# Patient Record
Sex: MALE | Race: BLACK OR AFRICAN AMERICAN | Employment: FULL TIME | URBAN - METROPOLITAN AREA
[De-identification: names, ages, dates, MRNs, and addresses within clinical notes are randomized per-mention and may not be internally consistent; named-entity substitution may affect disease eponyms.]

---

## 2017-12-10 ENCOUNTER — HOSPITAL ENCOUNTER (EMERGENCY)
Age: 52
Discharge: HOME | End: 2017-12-10
Attending: EMERGENCY MEDICINE
Payer: COMMERCIAL

## 2017-12-10 ENCOUNTER — APPOINTMENT (OUTPATIENT)
Dept: GENERAL RADIOLOGY | Age: 52
End: 2017-12-10
Attending: EMERGENCY MEDICINE
Payer: COMMERCIAL

## 2017-12-10 VITALS
SYSTOLIC BLOOD PRESSURE: 110 MMHG | DIASTOLIC BLOOD PRESSURE: 65 MMHG | RESPIRATION RATE: 16 BRPM | OXYGEN SATURATION: 98 % | HEART RATE: 75 BPM | TEMPERATURE: 99.1 F

## 2017-12-10 DIAGNOSIS — J18.9 PNEUMONIA DUE TO INFECTIOUS ORGANISM, UNSPECIFIED LATERALITY, UNSPECIFIED PART OF LUNG: Primary | ICD-10-CM

## 2017-12-10 DIAGNOSIS — R55 SYNCOPE AND COLLAPSE: ICD-10-CM

## 2017-12-10 LAB
ANION GAP SERPL CALC-SCNC: 8 MMOL/L (ref 3–18)
BASOPHILS # BLD: 0 K/UL (ref 0–0.1)
BASOPHILS NFR BLD: 0 % (ref 0–2)
BUN SERPL-MCNC: 15 MG/DL (ref 7–18)
BUN/CREAT SERPL: 12 (ref 12–20)
CALCIUM SERPL-MCNC: 8.7 MG/DL (ref 8.5–10.1)
CHLORIDE SERPL-SCNC: 102 MMOL/L (ref 100–108)
CO2 SERPL-SCNC: 28 MMOL/L (ref 21–32)
CREAT SERPL-MCNC: 1.21 MG/DL (ref 0.6–1.3)
DIFFERENTIAL METHOD BLD: ABNORMAL
EOSINOPHIL # BLD: 0 K/UL (ref 0–0.4)
EOSINOPHIL NFR BLD: 0 % (ref 0–5)
ERYTHROCYTE [DISTWIDTH] IN BLOOD BY AUTOMATED COUNT: 17.6 % (ref 11.6–14.5)
GLUCOSE SERPL-MCNC: 153 MG/DL (ref 74–99)
HCT VFR BLD AUTO: 39.4 % (ref 36–48)
HGB BLD-MCNC: 12.4 G/DL (ref 13–16)
LYMPHOCYTES # BLD: 0.9 K/UL (ref 0.9–3.6)
LYMPHOCYTES NFR BLD: 6 % (ref 21–52)
MCH RBC QN AUTO: 21.5 PG (ref 24–34)
MCHC RBC AUTO-ENTMCNC: 31.5 G/DL (ref 31–37)
MCV RBC AUTO: 68.3 FL (ref 74–97)
MONOCYTES # BLD: 0.7 K/UL (ref 0.05–1.2)
MONOCYTES NFR BLD: 5 % (ref 3–10)
NEUTS SEG # BLD: 13.8 K/UL (ref 1.8–8)
NEUTS SEG NFR BLD: 89 % (ref 40–73)
PLATELET # BLD AUTO: 362 K/UL (ref 135–420)
PMV BLD AUTO: 9.5 FL (ref 9.2–11.8)
POTASSIUM SERPL-SCNC: 4.4 MMOL/L (ref 3.5–5.5)
RBC # BLD AUTO: 5.77 M/UL (ref 4.7–5.5)
SODIUM SERPL-SCNC: 138 MMOL/L (ref 136–145)
WBC # BLD AUTO: 15.4 K/UL (ref 4.6–13.2)

## 2017-12-10 PROCEDURE — 80048 BASIC METABOLIC PNL TOTAL CA: CPT | Performed by: EMERGENCY MEDICINE

## 2017-12-10 PROCEDURE — 99284 EMERGENCY DEPT VISIT MOD MDM: CPT

## 2017-12-10 PROCEDURE — 74011250637 HC RX REV CODE- 250/637: Performed by: EMERGENCY MEDICINE

## 2017-12-10 PROCEDURE — 71010 XR CHEST SNGL V: CPT

## 2017-12-10 PROCEDURE — 85025 COMPLETE CBC W/AUTO DIFF WBC: CPT | Performed by: EMERGENCY MEDICINE

## 2017-12-10 PROCEDURE — 93005 ELECTROCARDIOGRAM TRACING: CPT

## 2017-12-10 RX ORDER — LEVOFLOXACIN 750 MG/1
750 TABLET ORAL
Status: COMPLETED | OUTPATIENT
Start: 2017-12-10 | End: 2017-12-10

## 2017-12-10 RX ORDER — LEVOFLOXACIN 750 MG/1
750 TABLET ORAL DAILY
Qty: 5 TAB | Refills: 0 | Status: SHIPPED | OUTPATIENT
Start: 2017-12-10 | End: 2017-12-15

## 2017-12-10 RX ADMIN — LEVOFLOXACIN 750 MG: 750 TABLET, FILM COATED ORAL at 15:05

## 2017-12-10 NOTE — ED PROVIDER NOTES
EMERGENCY DEPARTMENT HISTORY AND PHYSICAL EXAM    2:13 PM      Date: 12/10/2017  Patient Name: Yessy Esquivel    History of Presenting Illness     Chief Complaint   Patient presents with    Syncope         History Provided By: Patient    Chief Complaint: Lightheadedness, Syncope  Duration:  Seconds  Timing:  Acute  Location: N/A  Quality: N/A  Severity: N/A  Modifying Factors: Made worse with position changes, sitting to standing. Associated Symptoms: diaphoresis, cough, congestion, chills      Additional History (Context): Yessy Esquivel is a 47 y.o. male with a history of HTN, HLD, and diabetes, who presents to the ED via EMS with complaint of lightheadedness with witnessed syncope and fall just prior to arrival. Patient states that he was feeling lightheaded when sitting down, so he decided to go take a nap. Patient reports increased lightheadedness with standing \"and the next thing I knew I was on the ground. \" Patient does not remember falling. His fiance witnessed the episode and states that patient braced against the wall and fell. She denies head trauma as she was next to patient and notes that his head hit her foot. Patient denies current lightheadedness and states that he is back to baseline. He notes recent cough, congestion, and chills, but otherwise denies recent illness or contact with sick individuals. He denies associated neck pain, back pain, seizures, urinary incontinence, recent bilateral leg pain or swelling, shortness of breath, abdominal pain, nausea, vomiting, recent diarrhea, or bleeding episodes. Patient states that he was seen by his PCP at home last week and had a normal workup. He is currently visiting the area from [place] and reports that he drove here with only 2 stops. Patient reports previous experience of lightheadedness and syncope approximately 6 years ago, but was not evaluated following that incident. He has not gotten a flu shot this season.     PCP: Wale De La Torre MD Patient is currently visiting the area from [place]. Past History     Past Medical History:  Past Medical History:   Diagnosis Date    Diabetes ([address]     Hyperlipidemia     Hypertension        Past Surgical History:  No past surgical history on file. Family History:  No family history on file. Social History:  Social History   Substance Use Topics    Smoking status: Not on file    Smokeless tobacco: Not on file    Alcohol use Not on file       Allergies:  No Known Allergies      Review of Systems     Review of Systems   Constitutional: Positive for chills. Negative for fever. HENT: Positive for congestion. Eyes: Negative. Respiratory: Positive for cough. Negative for shortness of breath. Cardiovascular: Negative. Negative for chest pain and leg swelling. Gastrointestinal: Negative. Negative for abdominal pain, anal bleeding, blood in stool, diarrhea, nausea and vomiting. Endocrine: Negative. Genitourinary: Negative. Denies urinary incontinence. Musculoskeletal: Negative. Negative for back pain and neck pain. Skin: Negative. Allergic/Immunologic: Negative. Neurological: Positive for syncope and light-headedness. Negative for seizures. Hematological: Negative. Psychiatric/Behavioral: Negative. All other systems reviewed and are negative. Physical Exam     Visit Vitals    /65 (BP 1 Location: Right arm, BP Patient Position: At rest)    Pulse 75    Temp 99.1 °F (37.3 °C)    Resp 16    SpO2 98%       Physical Exam   Constitutional: He is oriented to person, place, and time. He appears well-developed. Mild diaphoresis. HENT:   Head: Normocephalic and atraumatic. Eyes: Conjunctivae and EOM are normal.   Neck: Normal range of motion. Cardiovascular: Normal heart sounds. Exam reveals no gallop and no friction rub. No murmur heard. No calf tenderness, no BLE swelling.    Pulmonary/Chest: Effort normal and breath sounds normal. No stridor. No respiratory distress. He has no wheezes. He has no rales. Minimal productive cough. Abdominal: Soft. There is no tenderness. Musculoskeletal: Normal range of motion. He exhibits no tenderness. Neurological: He is alert and oriented to person, place, and time. Skin: Skin is warm and dry. He is not diaphoretic. Psychiatric: He has a normal mood and affect. His behavior is normal.   Nursing note and vitals reviewed. Diagnostic Study Results     Labs -  Recent Results (from the past 12 hour(s))   CBC WITH AUTOMATED DIFF    Collection Time: 12/10/17  2:22 PM   Result Value Ref Range    WBC 15.4 (H) 4.6 - 13.2 K/uL    RBC 5.77 (H) 4.70 - 5.50 M/uL    HGB 12.4 (L) 13.0 - 16.0 g/dL    HCT 39.4 36.0 - 48.0 %    MCV 68.3 (L) 74.0 - 97.0 FL    MCH 21.5 (L) 24.0 - 34.0 PG    MCHC 31.5 31.0 - 37.0 g/dL    RDW 17.6 (H) 11.6 - 14.5 %    PLATELET 894 198 - 241 K/uL    MPV 9.5 9.2 - 11.8 FL    NEUTROPHILS 89 (H) 40 - 73 %    LYMPHOCYTES 6 (L) 21 - 52 %    MONOCYTES 5 3 - 10 %    EOSINOPHILS 0 0 - 5 %    BASOPHILS 0 0 - 2 %    ABS. NEUTROPHILS 13.8 (H) 1.8 - 8.0 K/UL    ABS. LYMPHOCYTES 0.9 0.9 - 3.6 K/UL    ABS. MONOCYTES 0.7 0.05 - 1.2 K/UL    ABS. EOSINOPHILS 0.0 0.0 - 0.4 K/UL    ABS.  BASOPHILS 0.0 0.0 - 0.1 K/UL    DF AUTOMATED     METABOLIC PANEL, BASIC    Collection Time: 12/10/17  2:22 PM   Result Value Ref Range    Sodium 138 136 - 145 mmol/L    Potassium 4.4 3.5 - 5.5 mmol/L    Chloride 102 100 - 108 mmol/L    CO2 28 21 - 32 mmol/L    Anion gap 8 3.0 - 18 mmol/L    Glucose 153 (H) 74 - 99 mg/dL    BUN 15 7.0 - 18 MG/DL    Creatinine 1.21 0.6 - 1.3 MG/DL    BUN/Creatinine ratio 12 12 - 20      GFR est AA >60 >60 ml/min/1.73m2    GFR est non-AA >60 >60 ml/min/1.73m2    Calcium 8.7 8.5 - 10.1 MG/DL   EKG, 12 LEAD, INITIAL    Collection Time: 12/10/17  2:23 PM   Result Value Ref Range    Ventricular Rate 66 BPM    Atrial Rate 66 BPM    P-R Interval 144 ms    QRS Duration 76 ms    Q-T Interval 374 ms    QTC Calculation (Bezet) 392 ms    Calculated P Axis 59 degrees    Calculated R Axis 18 degrees    Calculated T Axis 37 degrees    Diagnosis       Normal sinus rhythm  Possible Left atrial enlargement  Borderline ECG  No previous ECGs available         Radiologic Studies -   XR CHEST SNGL V    (Results Pending)         Medical Decision Making     Provider Notes (Medical Decision Making): Here with some resp symptoms but otherwise no other related sx to his syncopal episode. Assoc with lightheadeness, no cardiac or CHF related sx. Will check CXR given resp sx, ekg is reassuring- no prolonged qtc, brugada, ACS, LVH, etc.     I am the first provider for this patient. I reviewed the vital signs, available nursing notes, past medical history, past surgical history, family history and social history. Vital Signs-Reviewed the patient's vital signs. EKG: Interpreted by the EP. Time Interpreted: 2:26 PM   Rate: 73   Rhythm: Normal Sinus Rhythm   Interpretation: No brugada morphology, no QTc prolongation. Records Reviewed: Nursing Notes (Time of Review: 2:13 PM)    ED Course: Progress Notes, Reevaluation, and Consults:  -Re-evaluted the patient.  -Results including elevated WBC were discussed and reviewed with pt who understood the implications. No other focality in workup. Discussed with patient that given cough, will cover for PNA as CXR can lag behind clinical sx.    -We discussed the diagnosis, treatment, and plan. Next steps in close outpt care include: Follow up with PCP upon return to [place] next week. -They verbally convey understanding and agreement of the signs, symptoms, diagnosis, treatment and prognosis and additionally agree to follow up as discussed. All questions were answered, and we reviewed pertinent return precautions as seen in the discharge paperwork. Pt understood follow up instructions, and would return to the ED if any worsening or concerns.      Sruthi Burden MD 3:02 PM      Diagnosis     Clinical Impression:   1. Pneumonia due to infectious organism, unspecified laterality, unspecified part of lung    2. Syncope and collapse        Disposition: Discharge    Follow-up Information     Follow up With Details Comments Contact Info    Your PCP Schedule an appointment as soon as possible for a visit in 1 week      [facility]  As needed, If symptoms worsen [address] [facility] [id number]  [phone]           Patient's Medications   Start Taking    LEVOFLOXACIN (LEVAQUIN) 750 MG TABLET    Take 1 Tab by mouth daily for 5 days. Continue Taking    No medications on file   These Medications have changed    No medications on file   Stop Taking    No medications on file     _______________________________    Scribe Attestation:     Yossi Hernandez, acting as a scribe for and in the presence of Santos Finn MD      December 10, 2017 at 2:13 PM       Provider Attestation:      I personally performed the services described in the documentation, reviewed the documentation, as recorded by the scribe in my presence, and it accurately and completely records my words and actions.  December 10, 2017 at 2:13 PM - Santos Finn MD      _______________________________

## 2017-12-10 NOTE — ED TRIAGE NOTES
Pt brought to ED by medic for syncopal episode. Fall was witnessed, family states he did not hit his head.   Blood sugar per medic 137

## 2017-12-10 NOTE — DISCHARGE INSTRUCTIONS
Pneumonia: Care Instructions  Your Care Instructions    Pneumonia is an infection of the lungs. Most cases are caused by infections from bacteria or viruses. Pneumonia may be mild or very severe. If it is caused by bacteria, you will be treated with antibiotics. It may take a few weeks to a few months to recover fully from pneumonia, depending on how sick you were and whether your overall health is good. Follow-up care is a key part of your treatment and safety. Be sure to make and go to all appointments, and call your doctor if you are having problems. It's also a good idea to know your test results and keep a list of the medicines you take. How can you care for yourself at home? · Take your antibiotics exactly as directed. Do not stop taking the medicine just because you are feeling better. You need to take the full course of antibiotics. · Take your medicines exactly as prescribed. Call your doctor if you think you are having a problem with your medicine. · Get plenty of rest and sleep. You may feel weak and tired for a while, but your energy level will improve with time. · To prevent dehydration, drink plenty of fluids, enough so that your urine is light yellow or clear like water. Choose water and other caffeine-free clear liquids until you feel better. If you have kidney, heart, or liver disease and have to limit fluids, talk with your doctor before you increase the amount of fluids you drink. · Take care of your cough so you can rest. A cough that brings up mucus from your lungs is common with pneumonia. It is one way your body gets rid of the infection. But if coughing keeps you from resting or causes severe fatigue and chest-wall pain, talk to your doctor. He or she may suggest that you take a medicine to reduce the cough. · Use a vaporizer or humidifier to add moisture to your bedroom. Follow the directions for cleaning the machine. · Do not smoke or allow others to smoke around you.  Smoke will make your cough last longer. If you need help quitting, talk to your doctor about stop-smoking programs and medicines. These can increase your chances of quitting for good. · Take an over-the-counter pain medicine, such as acetaminophen (Tylenol), ibuprofen (Advil, Motrin), or naproxen (Aleve). Read and follow all instructions on the label. · Do not take two or more pain medicines at the same time unless the doctor told you to. Many pain medicines have acetaminophen, which is Tylenol. Too much acetaminophen (Tylenol) can be harmful. · If you were given a spirometer to measure how well your lungs are working, use it as instructed. This can help your doctor tell how your recovery is going. · To prevent pneumonia in the future, talk to your doctor about getting a flu vaccine (once a year) and a pneumococcal vaccine (one time only for most people). When should you call for help? Call 911 anytime you think you may need emergency care. For example, call if:  ? · You have severe trouble breathing. ?Call your doctor now or seek immediate medical care if:  ? · You cough up dark brown or bloody mucus (sputum). ? · You have new or worse trouble breathing. ? · You are dizzy or lightheaded, or you feel like you may faint. ? Watch closely for changes in your health, and be sure to contact your doctor if:  ? · You have a new or higher fever. ? · You are coughing more deeply or more often. ? · You are not getting better after 2 days (48 hours). ? · You do not get better as expected. Where can you learn more? Go to http://robert-ela.info/. Enter [id number] in the search box to learn more about \"Pneumonia: Care Instructions. \"  Current as of: May 12, 2017  Content Version: 11.4  © 1394-0905 [employer]. Care instructions adapted under license by [employer] (which disclaims liability or warranty for this information).  If you have questions about a medical condition or this instruction, always ask your healthcare professional. [place] [address] any warranty or liability for your use of this information. Fainting: Care Instructions  Your Care Instructions    When you faint, or pass out, you lose consciousness for a short time. A brief drop in blood flow to the brain often causes it. When you fall or lie down, more blood flows to your brain and you regain consciousness. Emotional stress, pain, or overheating-especially if you have been standing-can make you faint. In these cases, fainting is usually not serious. But fainting can be a sign of a more serious problem. Your doctor may want you to have more tests to rule out other causes. The treatment you need depends on the reason why you fainted. The doctor has checked you carefully, but problems can develop later. If you notice any problems or new symptoms, get medical treatment right away. Follow-up care is a key part of your treatment and safety. Be sure to make and go to all appointments, and call your doctor if you are having problems. It's also a good idea to know your test results and keep a list of the medicines you take. How can you care for yourself at home? · Drink plenty of fluids to prevent dehydration. If you have kidney, heart, or liver disease and have to limit fluids, talk with your doctor before you increase your fluid intake. When should you call for help? Call 911 anytime you think you may need emergency care. For example, call if:  ? · You have symptoms of a heart problem. These may include:  ¨ Chest pain or pressure. ¨ Severe trouble breathing. ¨ A fast or irregular heartbeat. ¨ Lightheadedness or sudden weakness. ¨ Coughing up pink, foamy mucus. ¨ Passing out. After you call 911, the  may tell you to chew 1 adult-strength or 2 to 4 low-dose aspirin. Wait for an ambulance. Do not try to drive yourself. ? · You have symptoms of a stroke.  These may include:  ¨ Sudden numbness, tingling, weakness, or loss of movement in your face, arm, or leg, especially on only one side of your body. ¨ Sudden vision changes. ¨ Sudden trouble speaking. ¨ Sudden confusion or trouble understanding simple statements. ¨ Sudden problems with walking or balance. ¨ A sudden, severe headache that is different from past headaches. ? · You passed out (lost consciousness) again. ? Watch closely for changes in your health, and be sure to contact your doctor if:  ? · You do not get better as expected. Where can you learn more? Go to http://robert-ela.info/. Enter A527 in the search box to learn more about \"Fainting: Care Instructions. \"  Current as of: March 20, 2017  Content Version: 11.4  © 4452-7095 [employer]. Care instructions adapted under license by [employer] (which disclaims liability or warranty for this information).  If you have questions about a medical condition or this instruction, always ask your healthcare professional. [address] any warranty or liability for your use of this information.

## 2017-12-11 LAB
ATRIAL RATE: 73 BPM
CALCULATED P AXIS, ECG09: 63 DEGREES
CALCULATED R AXIS, ECG10: 18 DEGREES
CALCULATED T AXIS, ECG11: 44 DEGREES
DIAGNOSIS, 93000: NORMAL
P-R INTERVAL, ECG05: 158 MS
Q-T INTERVAL, ECG07: 380 MS
QRS DURATION, ECG06: 76 MS
QTC CALCULATION (BEZET), ECG08: 418 MS
VENTRICULAR RATE, ECG03: 73 BPM

## 2024-07-02 ENCOUNTER — HOSPITAL ENCOUNTER (EMERGENCY)
Facility: HOSPITAL | Age: 59
Discharge: HOME | End: 2024-07-02
Attending: EMERGENCY MEDICINE
Payer: COMMERCIAL

## 2024-07-02 VITALS
WEIGHT: 240 LBS | OXYGEN SATURATION: 99 % | HEART RATE: 57 BPM | HEIGHT: 72 IN | BODY MASS INDEX: 32.51 KG/M2 | DIASTOLIC BLOOD PRESSURE: 69 MMHG | RESPIRATION RATE: 16 BRPM | TEMPERATURE: 98 F | SYSTOLIC BLOOD PRESSURE: 129 MMHG

## 2024-07-02 DIAGNOSIS — K06.8 GINGIVAL BLEEDING: Primary | ICD-10-CM

## 2024-07-02 PROBLEM — Z86.39 H/O HYPERLIPIDEMIA: Status: ACTIVE | Noted: 2024-07-02

## 2024-07-02 PROBLEM — I10 HYPERTENSION: Status: ACTIVE | Noted: 2024-07-02

## 2024-07-02 PROBLEM — E11.9 TYPE 2 DIABETES MELLITUS (CMS/HCC): Status: ACTIVE | Noted: 2024-07-02

## 2024-07-02 LAB
GLUCOSE BLD-MCNC: 124 MG/DL (ref 70–99)
POCT TEST: ABNORMAL

## 2024-07-02 PROCEDURE — 99282 EMERGENCY DEPT VISIT SF MDM: CPT

## 2024-07-02 RX ORDER — METFORMIN HYDROCHLORIDE 500 MG/1
500 TABLET ORAL 2 TIMES DAILY WITH MEALS
COMMUNITY

## 2024-07-02 RX ORDER — ATORVASTATIN CALCIUM 40 MG/1
40 TABLET, FILM COATED ORAL DAILY
COMMUNITY
Start: 2023-07-18

## 2024-07-02 RX ORDER — AMLODIPINE AND BENAZEPRIL HYDROCHLORIDE 10; 40 MG/1; MG/1
1 CAPSULE ORAL DAILY
COMMUNITY
Start: 2023-11-30

## 2024-07-03 ASSESSMENT — ENCOUNTER SYMPTOMS
NAUSEA: 0
ABDOMINAL PAIN: 0
VOMITING: 0
COUGH: 0
HEADACHES: 0
ACTIVITY CHANGE: 0
COLOR CHANGE: 0
CONFUSION: 0
SHORTNESS OF BREATH: 0
CHILLS: 0
FATIGUE: 0
MUSCULOSKELETAL NEGATIVE: 1
APPETITE CHANGE: 0
WOUND: 0
TROUBLE SWALLOWING: 0
VOICE CHANGE: 0

## 2024-07-03 NOTE — DISCHARGE INSTRUCTIONS
Please follow-up with your primary care provider in the next 2-3 days to discuss your visit here in the emergency department as well as to review any labs and/or imaging obtained during your time here.    Please follow-up with the dentist as soon as possible.  I have provided information for several dental clinics ([facility] and [facility]).  Please call and schedule an appointment or show up and walk-in or schedule an appointment.    Please do not hesitate to return to the emergency department if you have any new or worsening symptoms.

## 2024-07-03 NOTE — ED ATTESTATION NOTE
Case discussed with PA. I supervised care and agree with plan.       Cassandra Baeza MD  07/03/24 [id number]

## 2024-07-03 NOTE — ED PROVIDER NOTES
Emergency Medicine Note  HPI   HISTORY OF PRESENT ILLNESS     Patient is a 59-year-old M with PMH of HTN, HLD, T2DM.  He is presenting to the ED today with complaint of bleeding from his mouth.  He reports that he was leaving a bar earlier when he noticed blood coming out of his mouth.  Unknown origin of bleeding.  He denies any pain.  Bleeding controlled on arrival and he has not had any bleeding since then.  Patient states that it has been a very long time since he went to the dentist but is interested in following up with one.  He does not take any blood thinning medications.          Patient History   PAST HISTORY     Reviewed from Nursing Triage:       Past Medical History:   Diagnosis Date    H/O hyperlipidemia     Hypertension     Type 2 diabetes mellitus (CMS/HCC)        History reviewed. No pertinent surgical history.    History reviewed. No pertinent family history.    Social History     Tobacco Use    Smoking status: Never    Smokeless tobacco: Never   Substance Use Topics    Alcohol use: Yes    Drug use: Never         Review of Systems   REVIEW OF SYSTEMS     Review of Systems   Constitutional:  Negative for activity change, appetite change, chills and fatigue.   HENT:  Positive for dental problem. Negative for nosebleeds, trouble swallowing and voice change.         Bleeding from mouth   Eyes:  Negative for visual disturbance.   Respiratory:  Negative for cough and shortness of breath.    Cardiovascular:  Negative for chest pain.   Gastrointestinal:  Negative for abdominal pain, nausea and vomiting.   Genitourinary: Negative.    Musculoskeletal: Negative.    Skin:  Negative for color change, rash and wound.   Neurological:  Negative for syncope and headaches.   Psychiatric/Behavioral:  Negative for confusion.          VITALS     ED Vitals      Date/Time Temp Pulse Resp BP SpO2 [facility]   07/02/24 2249 36.7 °C (98 °F) -- 16 129/69 99 % DN   07/02/24 2130 -- 57 16 127/72 98 % DNG   07/02/24 2054 -- 65 18  142/77 100 % DNG   07/02/24 1952 36.6 °C (97.8 °F) 74 18 136/91 98 % ESR                         Physical Exam   PHYSICAL EXAM     Physical Exam  Vitals reviewed.   Constitutional:       General: He is not in acute distress.     Appearance: Normal appearance. He is well-developed and normal weight. He is not ill-appearing, toxic-appearing or diaphoretic.   HENT:      Head: Normocephalic and atraumatic.      Nose: No congestion.      Mouth/Throat:      Dentition: Abnormal dentition. Dental caries present.      Pharynx: Oropharynx is clear. Uvula midline. No pharyngeal swelling, posterior oropharyngeal erythema or uvula swelling.      Tonsils: No tonsillar exudate.        Comments: Patient with overall poor dentition.  Area of prior bleeding visualized behind left upper incisor.  No active hemorrhaging at this time.  Patient with numerous caries and also appears to have gingivitis.  No other laceration or source of bleeding visualized in the patient's mouth or throat.  No abscess appreciated and no tenderness to palpation of gingiva.  Eyes:      Conjunctiva/sclera: Conjunctivae normal.   Cardiovascular:      Rate and Rhythm: Normal rate.   Pulmonary:      Effort: Pulmonary effort is normal. No respiratory distress.   Musculoskeletal:         General: Normal range of motion.   Skin:     General: Skin is warm and dry.   Neurological:      Mental Status: He is alert and oriented to person, place, and time.   Psychiatric:         Mood and Affect: Mood normal.         Behavior: Behavior normal.           PROCEDURES     Procedures     DATA     Results       None            Imaging Results    None         No orders to display       Scoring tools                                  ED Course & MDM   MDM / ED COURSE / CLINICAL IMPRESSION / DISPO     Medical Decision Making  Patient presents to the ED with prior bleeding from mouth, now resolved on my examination.  He has not had any continued bleeding.  Patient has overall poor  dentition and area concerning for prior bleed, now controlled with no further hemorrhaging.  Patient states that he feels well at this time and has no other complaints.  No tenderness to palpation of gingiva, otherwise normal examination of mouth.  Will plan to discharge home and recommend prompt outpatient follow-up with dentist.  Patient given information for both [facility] and [facility].  He expressed understanding of the recommendation for follow-up and will call tomorrow to schedule an appointment.  Return to ED precautions given.  Plan to discharge home at this time.    Problems Addressed:  Gingival bleeding: acute illness or injury           Clinical Impression      Gingival bleeding     _________________       ED Disposition   Discharge                       Joellen De PA C  07/03/24 0013